# Patient Record
Sex: FEMALE | Race: OTHER | NOT HISPANIC OR LATINO | URBAN - METROPOLITAN AREA
[De-identification: names, ages, dates, MRNs, and addresses within clinical notes are randomized per-mention and may not be internally consistent; named-entity substitution may affect disease eponyms.]

---

## 2017-11-25 ENCOUNTER — EMERGENCY (EMERGENCY)
Facility: HOSPITAL | Age: 32
LOS: 0 days | Discharge: HOME | End: 2017-11-25
Attending: EMERGENCY MEDICINE | Admitting: EMERGENCY MEDICINE
Payer: MEDICAID

## 2017-11-25 VITALS
DIASTOLIC BLOOD PRESSURE: 99 MMHG | OXYGEN SATURATION: 100 % | SYSTOLIC BLOOD PRESSURE: 157 MMHG | RESPIRATION RATE: 17 BRPM | TEMPERATURE: 98 F | HEART RATE: 70 BPM

## 2017-11-25 VITALS — HEIGHT: 64 IN | WEIGHT: 125 LBS

## 2017-11-25 DIAGNOSIS — M54.31 SCIATICA, RIGHT SIDE: ICD-10-CM

## 2017-11-25 PROCEDURE — 99283 EMERGENCY DEPT VISIT LOW MDM: CPT

## 2017-11-25 PROCEDURE — 72100 X-RAY EXAM L-S SPINE 2/3 VWS: CPT | Mod: 26

## 2017-11-25 RX ORDER — CYCLOBENZAPRINE HYDROCHLORIDE 10 MG/1
1 TABLET, FILM COATED ORAL
Qty: 15 | Refills: 0 | OUTPATIENT
Start: 2017-11-25 | End: 2017-11-30

## 2017-11-25 RX ORDER — KETOROLAC TROMETHAMINE 30 MG/ML
15 SYRINGE (ML) INJECTION ONCE
Qty: 0 | Refills: 0 | Status: DISCONTINUED | OUTPATIENT
Start: 2017-11-25 | End: 2017-11-25

## 2017-11-25 RX ORDER — IBUPROFEN 200 MG
1 TABLET ORAL
Qty: 15 | Refills: 0 | OUTPATIENT
Start: 2017-11-25

## 2017-11-25 RX ORDER — METHOCARBAMOL 500 MG/1
500 TABLET, FILM COATED ORAL ONCE
Qty: 0 | Refills: 0 | Status: COMPLETED | OUTPATIENT
Start: 2017-11-25 | End: 2017-11-25

## 2017-11-25 RX ADMIN — Medication 15 MILLIGRAM(S): at 20:17

## 2017-11-25 RX ADMIN — METHOCARBAMOL 500 MILLIGRAM(S): 500 TABLET, FILM COATED ORAL at 20:17

## 2017-11-25 NOTE — ED STATDOCS - NS_ ATTENDINGSCRIBEDETAILS _ED_A_ED_FT
I, Magdy Johnson MD,  performed the initial face to face bedside interview with this patient regarding history of present illness, review of symptoms and relevant past medical, social and family history.  I completed an independent physical examination.    The history, relevant review of systems, past medical and surgical history, medical decision making, and physical examination was documented by the scribe in my presence and I attest to the accuracy of the documentation.

## 2017-11-25 NOTE — ED STATDOCS - PROGRESS NOTE DETAILS
signed Mary Mccartney PA-C Pt seen initially in intake by Dr Johnson.   Pt c/o pain radiating down LLE for several months, worsening in past few days, denies trauma or increased activity. Pt has referral from PMD for neuro. Pt denies fever/chills, numbness, lower extremity weakness, saddle anesthesia,  or incontinence. Plan xray, toradol, robaxin. DC home with motrin and robaxin rx. If robaxin not covered by insurance will add rx for flexeril. Pt agrees with plan of  care. signed Mary Mccartney PA-C Pt seen initially in intake by Dr Johnson.   Pt c/o pain radiating down LLE for several months, worsening in past few days, denies trauma or increased activity. Pt has referral from PMD for neuro. Pt denies fever/chills, numbness, lower extremity weakness, saddle anesthesia,  or incontinence. Plan xray, toradol, muscle relaxer. DC home with motrin and flexeril rx. Pt agrees with plan of  care. x ray shows slight spondylolisthesis of L5 over S1. outpt f/u spine or neuro. Pt feeling well, pt and family agree with DC and plan of care.

## 2017-11-25 NOTE — ED STATDOCS - CONDITION AT DISCHARGE:
“You can access the FollowHealth Patient Portal, offered by [facility], by registering with the following website: [email or web address]” Satisfactory

## 2017-11-25 NOTE — ED STATDOCS - OBJECTIVE STATEMENT
33 yo healthy female presents with shooting pains and numbness down left leg for months, worse since Friday.  Symptoms have been constant since yesterday.  Denies trauma or heavy lifting.  States her leg becomes stiff at times and she has difficulty moving it.  Took Tylenol without relief.  PCP Dr. Jimenez.

## 2017-11-25 NOTE — ED ADULT TRIAGE NOTE - CHIEF COMPLAINT QUOTE
patient complaining of left buttocks pain shooting down leg and numbness for a few weeks, worse today.

## 2017-11-26 RX ORDER — CYCLOBENZAPRINE HYDROCHLORIDE 10 MG/1
1 TABLET, FILM COATED ORAL
Qty: 15 | Refills: 0
Start: 2017-11-26 | End: 2017-12-01

## 2017-11-26 RX ORDER — IBUPROFEN 200 MG
1 TABLET ORAL
Qty: 15 | Refills: 0
Start: 2017-11-26

## 2017-11-26 NOTE — ED POST DISCHARGE NOTE - RESULT SUMMARY
Pt called back to have her Rx sent to [facility] as [facility] doesn't accept her insurance. Rx sent to [facility]

## 2018-04-28 NOTE — ED ADULT NURSE NOTE - TEMPLATE LIST FOR HEAD TO TOE ASSESSMENT
Mother  Still living? Unknown  Family history of cirrhosis of liver, Age at diagnosis: Age Unknown     Grandparent  Still living? Unknown  Family history of cirrhosis of liver, Age at diagnosis: Age Unknown
Orthopedic

## 2021-09-16 ENCOUNTER — EMERGENCY (EMERGENCY)
Facility: HOSPITAL | Age: 36
LOS: 1 days | End: 2021-09-16
Attending: EMERGENCY MEDICINE
Payer: MEDICAID

## 2021-09-16 VITALS — DIASTOLIC BLOOD PRESSURE: 80 MMHG | SYSTOLIC BLOOD PRESSURE: 155 MMHG

## 2021-09-16 VITALS
WEIGHT: 149.91 LBS | TEMPERATURE: 98 F | SYSTOLIC BLOOD PRESSURE: 180 MMHG | OXYGEN SATURATION: 98 % | HEIGHT: 64 IN | HEART RATE: 76 BPM | DIASTOLIC BLOOD PRESSURE: 98 MMHG | RESPIRATION RATE: 16 BRPM

## 2021-09-16 LAB
ALBUMIN SERPL ELPH-MCNC: 4.3 G/DL — SIGNIFICANT CHANGE UP (ref 3.3–5.2)
ALP SERPL-CCNC: 80 U/L — SIGNIFICANT CHANGE UP (ref 40–120)
ALT FLD-CCNC: 21 U/L — SIGNIFICANT CHANGE UP
ANION GAP SERPL CALC-SCNC: 17 MMOL/L — SIGNIFICANT CHANGE UP (ref 5–17)
AST SERPL-CCNC: 45 U/L — HIGH
BASOPHILS # BLD AUTO: 0.04 K/UL — SIGNIFICANT CHANGE UP (ref 0–0.2)
BASOPHILS NFR BLD AUTO: 0.4 % — SIGNIFICANT CHANGE UP (ref 0–2)
BILIRUB SERPL-MCNC: 0.5 MG/DL — SIGNIFICANT CHANGE UP (ref 0.4–2)
BUN SERPL-MCNC: 8.4 MG/DL — SIGNIFICANT CHANGE UP (ref 8–20)
CALCIUM SERPL-MCNC: 8.6 MG/DL — SIGNIFICANT CHANGE UP (ref 8.6–10.2)
CHLORIDE SERPL-SCNC: 100 MMOL/L — SIGNIFICANT CHANGE UP (ref 98–107)
CO2 SERPL-SCNC: 21 MMOL/L — LOW (ref 22–29)
CREAT SERPL-MCNC: 0.43 MG/DL — LOW (ref 0.5–1.3)
EOSINOPHIL # BLD AUTO: 0.1 K/UL — SIGNIFICANT CHANGE UP (ref 0–0.5)
EOSINOPHIL NFR BLD AUTO: 1.1 % — SIGNIFICANT CHANGE UP (ref 0–6)
GLUCOSE SERPL-MCNC: 78 MG/DL — SIGNIFICANT CHANGE UP (ref 70–99)
HCG SERPL-ACNC: <4 MIU/ML — SIGNIFICANT CHANGE UP
HCT VFR BLD CALC: 39.6 % — SIGNIFICANT CHANGE UP (ref 34.5–45)
HGB BLD-MCNC: 13.3 G/DL — SIGNIFICANT CHANGE UP (ref 11.5–15.5)
IMM GRANULOCYTES NFR BLD AUTO: 0.5 % — SIGNIFICANT CHANGE UP (ref 0–1.5)
LYMPHOCYTES # BLD AUTO: 2.46 K/UL — SIGNIFICANT CHANGE UP (ref 1–3.3)
LYMPHOCYTES # BLD AUTO: 27 % — SIGNIFICANT CHANGE UP (ref 13–44)
MCHC RBC-ENTMCNC: 25.9 PG — LOW (ref 27–34)
MCHC RBC-ENTMCNC: 33.6 GM/DL — SIGNIFICANT CHANGE UP (ref 32–36)
MCV RBC AUTO: 77.2 FL — LOW (ref 80–100)
MONOCYTES # BLD AUTO: 0.5 K/UL — SIGNIFICANT CHANGE UP (ref 0–0.9)
MONOCYTES NFR BLD AUTO: 5.5 % — SIGNIFICANT CHANGE UP (ref 2–14)
NEUTROPHILS # BLD AUTO: 5.95 K/UL — SIGNIFICANT CHANGE UP (ref 1.8–7.4)
NEUTROPHILS NFR BLD AUTO: 65.5 % — SIGNIFICANT CHANGE UP (ref 43–77)
PLATELET # BLD AUTO: 286 K/UL — SIGNIFICANT CHANGE UP (ref 150–400)
POTASSIUM SERPL-MCNC: 5.5 MMOL/L — HIGH (ref 3.5–5.3)
POTASSIUM SERPL-SCNC: 5.5 MMOL/L — HIGH (ref 3.5–5.3)
PROT SERPL-MCNC: 7.6 G/DL — SIGNIFICANT CHANGE UP (ref 6.6–8.7)
RBC # BLD: 5.13 M/UL — SIGNIFICANT CHANGE UP (ref 3.8–5.2)
RBC # FLD: 15.1 % — HIGH (ref 10.3–14.5)
SODIUM SERPL-SCNC: 138 MMOL/L — SIGNIFICANT CHANGE UP (ref 135–145)
WBC # BLD: 9.1 K/UL — SIGNIFICANT CHANGE UP (ref 3.8–10.5)
WBC # FLD AUTO: 9.1 K/UL — SIGNIFICANT CHANGE UP (ref 3.8–10.5)

## 2021-09-16 PROCEDURE — 99284 EMERGENCY DEPT VISIT MOD MDM: CPT | Mod: 25

## 2021-09-16 PROCEDURE — 70450 CT HEAD/BRAIN W/O DYE: CPT | Mod: 26,MD

## 2021-09-16 PROCEDURE — 96374 THER/PROPH/DIAG INJ IV PUSH: CPT

## 2021-09-16 PROCEDURE — 80053 COMPREHEN METABOLIC PANEL: CPT

## 2021-09-16 PROCEDURE — 72125 CT NECK SPINE W/O DYE: CPT | Mod: 26,MD

## 2021-09-16 PROCEDURE — 36415 COLL VENOUS BLD VENIPUNCTURE: CPT

## 2021-09-16 PROCEDURE — 99285 EMERGENCY DEPT VISIT HI MDM: CPT

## 2021-09-16 PROCEDURE — 84702 CHORIONIC GONADOTROPIN TEST: CPT

## 2021-09-16 PROCEDURE — 72125 CT NECK SPINE W/O DYE: CPT | Mod: MD

## 2021-09-16 PROCEDURE — 70450 CT HEAD/BRAIN W/O DYE: CPT | Mod: MD

## 2021-09-16 PROCEDURE — 85025 COMPLETE CBC W/AUTO DIFF WBC: CPT

## 2021-09-16 RX ORDER — SODIUM CHLORIDE 9 MG/ML
1000 INJECTION INTRAMUSCULAR; INTRAVENOUS; SUBCUTANEOUS ONCE
Refills: 0 | Status: COMPLETED | OUTPATIENT
Start: 2021-09-16 | End: 2021-09-16

## 2021-09-16 RX ORDER — DIPHENHYDRAMINE HCL 50 MG
25 CAPSULE ORAL ONCE
Refills: 0 | Status: COMPLETED | OUTPATIENT
Start: 2021-09-16 | End: 2021-09-16

## 2021-09-16 RX ORDER — METOCLOPRAMIDE HCL 10 MG
10 TABLET ORAL ONCE
Refills: 0 | Status: COMPLETED | OUTPATIENT
Start: 2021-09-16 | End: 2021-09-16

## 2021-09-16 RX ADMIN — SODIUM CHLORIDE 1000 MILLILITER(S): 9 INJECTION INTRAMUSCULAR; INTRAVENOUS; SUBCUTANEOUS at 21:00

## 2021-09-16 RX ADMIN — Medication 25 MILLIGRAM(S): at 20:58

## 2021-09-16 RX ADMIN — Medication 104 MILLIGRAM(S): at 20:58

## 2021-09-16 NOTE — ED PROVIDER NOTE - PROVIDER TOKENS
PROVIDER:[TOKEN:[[id number]],FOLLOWUP:[Routine]],PROVIDER:[TOKEN:[[id number]],FOLLOWUP:[Routine]]

## 2021-09-16 NOTE — ED ADULT NURSE REASSESSMENT NOTE - NS ED NURSE REASSESS COMMENT FT1
Pt is alert and oriented. Pt states that she has a headache for 3 days associated with a stiff neck. Pt denies sob, chest pain, nausea, vomiting, and dizziness. Pt resp are even and unlabored, skin color susan for race. Pt updated on plan of care.

## 2021-09-16 NOTE — ED PROVIDER NOTE - CRANIAL NERVE AND PUPILLARY EXAM
central and peripheral vision intact/peripheral vision intact/extra-ocular movements intact/tongue is midline

## 2021-09-16 NOTE — ED PROVIDER NOTE - CLINICAL SUMMARY MEDICAL DECISION MAKING FREE TEXT BOX
female with headache and neck pain x  3 days no focal neuro deficits vitals stable no fever no tachycardia mildly hypertensive. will treat pain ct and re-eval

## 2021-09-16 NOTE — ED PROVIDER NOTE - PATIENT PORTAL LINK FT
You can access the FollowMyHealth Patient Portal offered by [facility] by registering at the following website: [email or web address]. By joining [employer]’s FollowMyHealth portal, you will also be able to view your health information using other applications (apps) compatible with our system.

## 2021-09-16 NOTE — ED PROVIDER NOTE - CARE PLAN
Principal Discharge DX:	Headache, acute  Secondary Diagnosis:	Hypertension  Secondary Diagnosis:	Hyperkalemia   1

## 2021-09-16 NOTE — ED PROVIDER NOTE - CARE PROVIDER_API CALL
Yash Agrawal (MD; PhD)  Neurosurgery  [address] [place] [address]  Phone: [phone]  Fax: [phone]  Follow Up Time: Routine    Kirk Gaston; PhD)  Neurology; Vascular Neurology  [address], [address]  [place] [address]  Phone: [phone]  Fax: [phone]  Follow Up Time: Routine

## 2021-09-16 NOTE — ED PROVIDER NOTE - CARE PROVIDERS DIRECT ADDRESSES
,DirectAddress_Unknown,[email or web address]

## 2021-09-16 NOTE — ED PROVIDER NOTE - PROGRESS NOTE DETAILS
mild hyperkalemia  advised on results of CT and need for fu with PMD   advised on symptomatic monitoring and treatment and strict return precautions

## 2021-09-16 NOTE — ED PROVIDER NOTE - OBJECTIVE STATEMENT
34 yo female hx of htn noncompliant on medication presenting with headache to the left side of the head with pain to the ear and neck. reporting mild relief with tylenol and last took ibu 800mg 5pm with minimal symptomatic relief. recent travel to [facility] no uri symptoms. no numbness tingling no reported visual changes no fever or chills no chest pain sob no abdominal pain. went to PMD and was told that she needs an MRI but that it wouldn't happen for a while. was advised to come to the ER instead. has been recently adjusted by her boyfriend whom is a chiropractor also without relief.

## 2021-09-16 NOTE — ED PROVIDER NOTE - MUSCULOSKELETAL, MLM
Spine appears normal, no midline tenderness or step offs + left cervical paraspinal muscle tenderness  range of motion is not limited, no muscle or joint tenderness

## 2021-09-16 NOTE — ED PROVIDER NOTE - EYES, MLM
Clear bilaterally, pupils equal, round and reactive to light. EOMI peripheral fields intact finger to nose intact

## 2021-09-16 NOTE — ED ADULT TRIAGE NOTE - CHIEF COMPLAINT QUOTE
Patient ambulated into ED with steady gait, Pt c/o headache and worsening stiff neck x 3days, took Ibuprofen, has history of HTN, doesn't take medication consistently.

## 2021-10-05 PROBLEM — Z00.00 ENCOUNTER FOR PREVENTIVE HEALTH EXAMINATION: Status: ACTIVE | Noted: 2021-10-05

## 2021-10-07 ENCOUNTER — APPOINTMENT (OUTPATIENT)
Dept: NEUROSURGERY | Facility: CLINIC | Age: 36
End: 2021-10-07
Payer: MEDICAID

## 2021-10-07 DIAGNOSIS — Z86.19 PERSONAL HISTORY OF OTHER INFECTIOUS AND PARASITIC DISEASES: ICD-10-CM

## 2021-10-07 DIAGNOSIS — I67.1 CEREBRAL ANEURYSM, NONRUPTURED: ICD-10-CM

## 2021-10-07 DIAGNOSIS — Z86.79 PERSONAL HISTORY OF OTHER DISEASES OF THE CIRCULATORY SYSTEM: ICD-10-CM

## 2021-10-07 PROCEDURE — 99203 OFFICE O/P NEW LOW 30 MIN: CPT

## 2021-10-07 NOTE — PHYSICAL EXAM
[General Appearance - Alert] : alert [General Appearance - In No Acute Distress] : in no acute distress [Person] : oriented to person [Place] : oriented to place [Time] : oriented to time [Motor Strength] : muscle strength was normal in all four extremities [Involuntary Movements] : no involuntary movements were seen [Abnormal Walk] : normal gait

## 2021-10-08 NOTE — ASSESSMENT
[FreeTextEntry1] : Impression: 2mm lica carotid cave aneurysm \par \par Plan:\par Reviewed mra brain with patient which shows the presence of a 2mm left carotid cave aneurysm\par Discussed the mri brain showed no hemorrhage\par Discussed risk of this aneurysm rupturing is very low less than 1 % per year\par Recommend continued conservative management with repeat mra brain no con next year \par Should she have sudden onset severe headache she must seek medical attention immediately. \par

## 2021-10-08 NOTE — HISTORY OF PRESENT ILLNESS
[de-identified] : Abbie Recinos is a 35yr old female here for a  new patient visit. She recently was diagnosed with lymes disease and hypertension. Two weeks ago she has worst headache of her life on the right side and he right neck was stiff, her vision was blurry. This lasted a few days, went to the ER at [facility] CT head was normal and she went home. Had MRI brain and MRA brain done outpatient at [facility]. MRA brain noted small 2mm left carotid cave aneurysm. Today she presents feeling well. Grandmother  from subarachnoid hemorrhage from ruptured aneurysm in [facility]. \par \par Rene Vasquez PCP

## 2021-11-17 ENCOUNTER — OUTPATIENT (OUTPATIENT)
Dept: OUTPATIENT SERVICES | Facility: HOSPITAL | Age: 36
LOS: 1 days | End: 2021-11-17
Payer: MEDICAID

## 2021-11-17 VITALS
HEIGHT: 64 IN | RESPIRATION RATE: 18 BRPM | DIASTOLIC BLOOD PRESSURE: 96 MMHG | SYSTOLIC BLOOD PRESSURE: 133 MMHG | WEIGHT: 149.91 LBS | TEMPERATURE: 99 F | OXYGEN SATURATION: 96 % | HEART RATE: 87 BPM

## 2021-11-17 DIAGNOSIS — I67.1 CEREBRAL ANEURYSM, NONRUPTURED: ICD-10-CM

## 2021-11-17 DIAGNOSIS — Z90.49 ACQUIRED ABSENCE OF OTHER SPECIFIED PARTS OF DIGESTIVE TRACT: Chronic | ICD-10-CM

## 2021-11-17 LAB
ANION GAP SERPL CALC-SCNC: 14 MMOL/L — SIGNIFICANT CHANGE UP (ref 5–17)
BLD GP AB SCN SERPL QL: NEGATIVE — SIGNIFICANT CHANGE UP
BUN SERPL-MCNC: 11 MG/DL — SIGNIFICANT CHANGE UP (ref 7–23)
CALCIUM SERPL-MCNC: 9.1 MG/DL — SIGNIFICANT CHANGE UP (ref 8.4–10.5)
CHLORIDE SERPL-SCNC: 104 MMOL/L — SIGNIFICANT CHANGE UP (ref 96–108)
CO2 SERPL-SCNC: 21 MMOL/L — LOW (ref 22–31)
CREAT SERPL-MCNC: 0.73 MG/DL — SIGNIFICANT CHANGE UP (ref 0.5–1.3)
GLUCOSE SERPL-MCNC: 113 MG/DL — HIGH (ref 70–99)
HCT VFR BLD CALC: 39.1 % — SIGNIFICANT CHANGE UP (ref 34.5–45)
HGB BLD-MCNC: 13.2 G/DL — SIGNIFICANT CHANGE UP (ref 11.5–15.5)
MCHC RBC-ENTMCNC: 26.5 PG — LOW (ref 27–34)
MCHC RBC-ENTMCNC: 33.8 GM/DL — SIGNIFICANT CHANGE UP (ref 32–36)
MCV RBC AUTO: 78.4 FL — LOW (ref 80–100)
NRBC # BLD: 0 /100 WBCS — SIGNIFICANT CHANGE UP (ref 0–0)
PLATELET # BLD AUTO: 276 K/UL — SIGNIFICANT CHANGE UP (ref 150–400)
POTASSIUM SERPL-MCNC: 4.1 MMOL/L — SIGNIFICANT CHANGE UP (ref 3.5–5.3)
POTASSIUM SERPL-SCNC: 4.1 MMOL/L — SIGNIFICANT CHANGE UP (ref 3.5–5.3)
RBC # BLD: 4.99 M/UL — SIGNIFICANT CHANGE UP (ref 3.8–5.2)
RBC # FLD: 14.7 % — HIGH (ref 10.3–14.5)
RH IG SCN BLD-IMP: POSITIVE — SIGNIFICANT CHANGE UP
SODIUM SERPL-SCNC: 139 MMOL/L — SIGNIFICANT CHANGE UP (ref 135–145)
WBC # BLD: 7.74 K/UL — SIGNIFICANT CHANGE UP (ref 3.8–10.5)
WBC # FLD AUTO: 7.74 K/UL — SIGNIFICANT CHANGE UP (ref 3.8–10.5)

## 2021-11-17 PROCEDURE — 85027 COMPLETE CBC AUTOMATED: CPT

## 2021-11-17 PROCEDURE — 86850 RBC ANTIBODY SCREEN: CPT

## 2021-11-17 PROCEDURE — 86900 BLOOD TYPING SEROLOGIC ABO: CPT

## 2021-11-17 PROCEDURE — 86901 BLOOD TYPING SEROLOGIC RH(D): CPT

## 2021-11-17 PROCEDURE — 80048 BASIC METABOLIC PNL TOTAL CA: CPT

## 2021-11-17 PROCEDURE — G0463: CPT

## 2021-11-17 PROCEDURE — 36415 COLL VENOUS BLD VENIPUNCTURE: CPT

## 2021-11-17 NOTE — H&P PST ADULT - HISTORY OF PRESENT ILLNESS
This is a 37 y/o female PMH hypertension, c/o severe headache, presented to the ER and was found to have cerebral aneurysm on imaging.  Presents today for cerebral angiogram. This is a 37 y/o female PMH hypertension, c/o severe headache, was found to have cerebral aneurysm on imaging last month.  Presents today for cerebral angiogram.

## 2021-11-17 NOTE — H&P PST ADULT - NSANTHOSAYNRD_GEN_A_CORE
No. BLANQUITA screening performed.  STOP BANG Legend: 0-2 = LOW Risk; 3-4 = INTERMEDIATE Risk; 5-8 = HIGH Risk

## 2021-11-17 NOTE — H&P PST ADULT - HEIGHT IN CM
"-- DO NOT REPLY / DO NOT REPLY ALL --  -- Message is from the [employer]--    COVID-19 Universal Screening: Negative    Caller is requesting an appointment - at a sooner time than what was available. Caller declined scheduling with a trusted partner or sister site               Patient is willing to be seen by: PCP only    Reason for Visit: Patient has surgery coming up in March patient is requesting a sooner appointment     Is the patient currently scheduled? Yes. Appointment date:  2/15/2021    Preferred time to be seen: morning    Caller Information       Type Contact Phone    02/05/2021 10:30 AM CST Phone (Incoming) Yari Valderrama (Self) [phone] (H)          Alternative phone number: none    Turnaround time given to caller: ""This message will be sent to [facility] Provider's name]. The clinical team will fulfill your request as soon as they review your message. \""  "
162.56

## 2021-11-20 ENCOUNTER — OUTPATIENT (OUTPATIENT)
Dept: OUTPATIENT SERVICES | Facility: HOSPITAL | Age: 36
LOS: 1 days | End: 2021-11-20
Payer: MEDICAID

## 2021-11-20 DIAGNOSIS — Z11.52 ENCOUNTER FOR SCREENING FOR COVID-19: ICD-10-CM

## 2021-11-20 DIAGNOSIS — Z90.49 ACQUIRED ABSENCE OF OTHER SPECIFIED PARTS OF DIGESTIVE TRACT: Chronic | ICD-10-CM

## 2021-11-20 LAB — SARS-COV-2 RNA SPEC QL NAA+PROBE: SIGNIFICANT CHANGE UP

## 2021-11-20 PROCEDURE — C9803: CPT

## 2021-11-20 PROCEDURE — U0003: CPT

## 2021-11-20 PROCEDURE — U0005: CPT

## 2021-11-23 ENCOUNTER — OUTPATIENT (OUTPATIENT)
Dept: OUTPATIENT SERVICES | Facility: HOSPITAL | Age: 36
LOS: 1 days | End: 2021-11-23
Payer: MEDICAID

## 2021-11-23 ENCOUNTER — APPOINTMENT (OUTPATIENT)
Dept: NEUROSURGERY | Facility: HOSPITAL | Age: 36
End: 2021-11-23
Payer: MEDICAID

## 2021-11-23 VITALS
WEIGHT: 149.91 LBS | HEIGHT: 64 IN | HEART RATE: 76 BPM | OXYGEN SATURATION: 100 % | DIASTOLIC BLOOD PRESSURE: 86 MMHG | RESPIRATION RATE: 18 BRPM | TEMPERATURE: 98 F | SYSTOLIC BLOOD PRESSURE: 147 MMHG

## 2021-11-23 VITALS
DIASTOLIC BLOOD PRESSURE: 68 MMHG | SYSTOLIC BLOOD PRESSURE: 130 MMHG | TEMPERATURE: 99 F | HEART RATE: 84 BPM | RESPIRATION RATE: 16 BRPM | OXYGEN SATURATION: 99 %

## 2021-11-23 DIAGNOSIS — I67.1 CEREBRAL ANEURYSM, NONRUPTURED: ICD-10-CM

## 2021-11-23 DIAGNOSIS — Z90.49 ACQUIRED ABSENCE OF OTHER SPECIFIED PARTS OF DIGESTIVE TRACT: Chronic | ICD-10-CM

## 2021-11-23 PROCEDURE — 36227 PLACE CATH XTRNL CAROTID: CPT

## 2021-11-23 PROCEDURE — C1760: CPT

## 2021-11-23 PROCEDURE — 36224 PLACE CATH CAROTD ART: CPT

## 2021-11-23 PROCEDURE — 36226 PLACE CATH VERTEBRAL ART: CPT | Mod: 50

## 2021-11-23 PROCEDURE — C1769: CPT

## 2021-11-23 PROCEDURE — 36226 PLACE CATH VERTEBRAL ART: CPT

## 2021-11-23 PROCEDURE — C1894: CPT

## 2021-11-23 PROCEDURE — C1887: CPT

## 2021-11-23 PROCEDURE — 76377 3D RENDER W/INTRP POSTPROCES: CPT | Mod: 26

## 2021-11-23 PROCEDURE — 36224 PLACE CATH CAROTD ART: CPT | Mod: 50

## 2021-11-23 RX ORDER — SODIUM CHLORIDE 9 MG/ML
1000 INJECTION INTRAMUSCULAR; INTRAVENOUS; SUBCUTANEOUS
Refills: 0 | Status: DISCONTINUED | OUTPATIENT
Start: 2021-11-23 | End: 2021-12-07

## 2021-11-23 RX ORDER — LABETALOL HCL 100 MG
1 TABLET ORAL
Qty: 0 | Refills: 0 | DISCHARGE

## 2021-11-23 NOTE — ASU DISCHARGE PLAN (ADULT/PEDIATRIC) - NURSING INSTRUCTIONS
Please feel free to contact us at [phone] if any problems arise. After 6PM, Monday through Friday, on weekends and on holidays, please call [phone] and ask for the radiology resident on call to be paged.

## 2021-11-23 NOTE — CHART NOTE - NSCHARTNOTEFT_GEN_A_CORE
Interventional Neuro Radiology  Pre-Procedure Note PA-C    This is a 36 year old right hand dominant female with a family history significant for           Allergies: No Known Allergies  PMHX: Hypertension  PSHX: appendectomy  Social History:   FAMILY HISTORY:+ grand mother ruptured aneurysm   Current Medications: Labetalol 200mg every 12 hours     Labs:                   Blood Bank:       Assessment/Plan:   This is a 36 year old right hand dominant female presents with   Patient presents to neuro-IR for selective cerebral angiography.   Procedure, goals, risks, benefits and alternatives  were discussed with patient and patient's family.  All questions were answered.  Risks include but are not limited to stroke, vessel injury, hemorrhage, and or right  groin hematoma.  Patient demonstrates understanding  of all risks involved with this procedure and wishes to continue.   Appropriate  content was obtained from patient and consent is in the patient's chart. Interventional Neuro Radiology  Pre-Procedure Note PA-C    This is a 36 year old right hand dominant female with a family history significant for ruptured aneurysm, her grandmother, and recent complaints of progressive headaches. Patient presents to Neuro IR for a selective cerebral angiography.     Upon exam patient is A + O x 3, speech is fluent, recent and remote memory intact, follows commands, PERRL, tongue is midline, moves all extremities, sensation intact, motor 5/5, ambulates without assist.      Allergies: No Known Allergies  PMHX: Hypertension  PSHX: appendectomy  Social History:   FAMILY HISTORY:+ grand mother ruptured aneurysm   Current Medications: Labetalol 200mg every 12 hours     Labs:                   Blood Bank:       Assessment/Plan:   This is a 36 year old right hand dominant female with complaints of headaches, with a finding of 2 left cavernous carotid artery aneurysm. Procedure, goals, risks, benefits and alternatives  were discussed with patient and patient's fiance. All questions were answered. Risks include but are not limited to stroke, vessel injury, hemorrhage, and or right groin hematoma.  Patient demonstrates understanding  of all risks involved with this procedure and wishes to continue.  Appropriate consent was obtained from patient and consent is in the patient's chart. Interventional Neuro Radiology  Pre-Procedure Note PA-C    This is a 36 year old right hand dominant female with a family history significant for ruptured aneurysm, her grandmother, and recent complaints of progressive headaches. Patient presents to Neuro IR for a selective cerebral angiography.     Upon exam patient is A + O x 3, speech is fluent, recent and remote memory intact, follows commands, PERRL, tongue is midline, moves all extremities, sensation intact, motor 5/5, ambulates without assist.      Allergies: No Known Allergies  PMHX: Hypertension  PSHX: appendectomy  Social History:   FAMILY HISTORY:+ grand mother ruptured aneurysm   Current Medications: Labetalol 200mg every 12 hours   Covid: not detected     CBC:          13.2  7.74   39.1   276    139  104  11    4.1   21  0.73  113      Blood Bank: B positive available     Assessment/Plan:   This is a 36 year old right hand dominant female with complaints of headaches, with a finding of 2 left cavernous carotid artery aneurysm. Procedure, goals, risks, benefits and alternatives  were discussed with patient and patient's fiance. All questions were answered. Risks include but are not limited to stroke, vessel injury, hemorrhage, and or right groin hematoma.  Patient demonstrates understanding  of all risks involved with this procedure and wishes to continue. Appropriate consent was obtained from patient and consent is in the patient's chart.

## 2021-11-23 NOTE — ASU DISCHARGE PLAN (ADULT/PEDIATRIC) - CARE PROVIDER_API CALL
Pavel Mallory)  Neurosurgery  [address], [address]  [place] [address]  Phone: [phone]  Fax: [phone]  Follow Up Time:

## 2021-11-23 NOTE — CHART NOTE - NSCHARTNOTEFT_GEN_A_CORE
Interventional Neuro- Radiology   Procedure Note PA-C    Procedure: Selective Cerebral Angiography   Pre- Procedure Diagnosis:  Post- Procedure Diagnosis:    : Dr Pavel Mallory  Physician Assistant: Anastasia Burris PA-C    Nurse:                   Raoul Hogan RN  Radiologic Tech:   Oniel Blair  Anesthesiologist:   Dr Bushra Rosenthal   Sheath:      I/Os: EBL less than 10cc  IV fluids:     cc Urine output     cc    Contrast Omnipaque 240      cc             Vitals: BP         HR 70     Spo2 99%          Preliminary Report:  Using a 5 [place]an short sheath to the right groin under MAC sedation via left vertebral artery,  left internal carotid artery, left external carotid artery, right vertebral artery, right internal carotid artery, right external carotid artery a selective cerebral angiography was performed and demonstrated                       Official note to follow.  Patient tolerated procedure well, hemodynamically stable, no change in neurological status compared to baseline.  Results discussed with neuro ICU team, patient and patient's family. Right groin sheath was removed, manual compression held to hemostasis for 20 minutes, no active bleeding, no hematoma, quick clot and safeguard balloon dressing applied at Interventional Neuro- Radiology   Procedure Note PA-C    Procedure: Selective Cerebral Angiography   Pre- Procedure Diagnosis: cerebral aneurysms   Post- Procedure Diagnosis:    : Dr Pavel Mallory  Physician Assistant: Anastasia Burris PA-C    Nurse:                   Raoul Hogan RN  Radiologic Tech:   Oniel Blair  Anesthesiologist:  Dr Bushra Nieves Galo   Sheath:                5 Argentine short sheath       I/Os: EBL less than 10cc  IV fluids:     cc Urine due to void  Contrast Omnipaque 240              Vitals: BP         HR 70     Spo2 99%          Preliminary Report:  Using a 5 Argentine short sheath to the right groin under MAC sedation via left vertebral artery, left internal carotid artery, left external carotid artery, right vertebral artery, right internal carotid artery, right external carotid artery a selective cerebral angiography was performed and demonstrated                       Official note to follow.  Patient tolerated procedure well, hemodynamically stable, no change in neurological status compared to baseline. Results discussed with patient and patient's fiance. Right groin sheath was removed, a Vascade device and manual compression held to hemostasis for 20 minutes, no active bleeding, no hematoma, quick clot and safeguard balloon dressing applied at Interventional Neuro- Radiology   Procedure Note PA-C    Procedure: Selective Cerebral Angiography   Pre- Procedure Diagnosis: cerebral aneurysms   Post- Procedure Diagnosis:    : Dr Pavel Mallory  Physician Assistant: Anastasia Burris PA-C    Nurse:                   Raoul Hogan RN  Radiologic Tech:   Oniel Blair  Anesthesiologist:  Dr Bushra Nieves Galo   Sheath:                 5 Frisian short sheath       I/Os: EBL less than 10cc  IV fluids:     cc Urine due to void  Contrast Omnipaque 240              Vitals: BP         HR 70     Spo2 99%          Preliminary Report:  Using a 5 Frisian short sheath to the right groin under MAC sedation via left vertebral artery, left internal carotid artery, left external carotid artery, right vertebral artery, right internal carotid artery, right external carotid artery a selective cerebral angiography was performed and demonstrated                       Official note to follow.  Patient tolerated procedure well, hemodynamically stable, no change in neurological status compared to baseline. Results discussed with patient and patient's fiance. Right groin sheath was removed, a Vascade device and manual compression held to hemostasis for 20 minutes, no active bleeding, no hematoma, quick clot and safeguard balloon dressing applied at Interventional Neuro- Radiology   Procedure Note PA-C    Procedure: Selective Cerebral Angiography   Pre- Procedure Diagnosis: cerebral aneurysms   Post- Procedure Diagnosis: 3mm left paraclinoid artery aneurysm     : Dr Pavel Mallory  Fellow:     Dr Mathew Gonzales   Physician Assistant: KARRIE Pineda PA-C  Resident:                  Dr Singh Mota     Nurse:                   Penny Vergara RN  Radiologic Tech:   Oniel Blair  Anesthesiologist:  Dr Bushra Nieves Galo   Sheath:                 5 Citizen of the [place] short sheath       I/Os: EBL less than 10cc  IV fluids: 250cc Urine due to void  Contrast Omnipaque 240 87cc             Vitals: /70        HR 70     Spo2 99%          Preliminary Report:  Using a 5 Citizen of the [place] short sheath to the right groin under MAC sedation via left vertebral artery, left internal carotid artery, left external carotid artery, right vertebral artery, right internal carotid artery, right external carotid artery a selective cerebral angiography was performed and demonstrated 3mm left paraclinoid artery aneurysm. Official note to follow.  Patient tolerated procedure well, hemodynamically stable, no change in neurological status compared to baseline. Results discussed with patient and patient's fiance. Right groin sheath was removed, a Vascade device and manual compression held to hemostasis for 20 minutes, no active bleeding, no hematoma, quick clot and safeguard balloon dressing applied at Interventional Neuro- Radiology   Procedure Note PA-C    Procedure: Selective Cerebral Angiography   Pre- Procedure Diagnosis: cerebral aneurysms   Post- Procedure Diagnosis: 3mm left paraclinoid artery aneurysm     : Dr Pavel Mallory  Fellow:     Dr Mathew Gonzales   Physician Assistant: KARRIE Pineda PA-C  Resident:                  Dr Singh Mota     Nurse:                   Penny Vergara RN  Radiologic Tech:   Oniel Blair  Anesthesiologist:  Dr Bushra Nieves Galo   Sheath:                 5 [place]ib[place]ian short sheath       I/Os: EBL less than 10cc  IV fluids: 250cc Urine due to void  Contrast Omnipaque 240 87cc             Vitals: /70        HR 70     Spo2 99%          Preliminary Report:  Using a 5 Djib[place]ian short sheath to the right groin under MAC sedation via left vertebral artery, left internal carotid artery, left external carotid artery, right vertebral artery, right internal carotid artery, right external carotid artery a selective cerebral angiography was performed and demonstrated 3mm left paraclinoid artery aneurysm. Official note to follow.  Patient tolerated procedure well, hemodynamically stable, no change in neurological status compared to baseline. Results discussed with patient and patient's fiance. Right groin sheath was removed, a Vascade device and manual compression by myself held to hemostasis for 20 minutes. No active bleeding, no hematoma, quick clot and safeguard balloon dressing applied at 2:45pm. Disposition IR recovery room.

## 2021-12-17 ENCOUNTER — EMERGENCY (EMERGENCY)
Facility: HOSPITAL | Age: 36
LOS: 0 days | Discharge: HOME | End: 2021-12-17
Attending: EMERGENCY MEDICINE
Payer: MEDICAID

## 2021-12-17 VITALS
OXYGEN SATURATION: 98 % | SYSTOLIC BLOOD PRESSURE: 136 MMHG | HEART RATE: 85 BPM | RESPIRATION RATE: 18 BRPM | DIASTOLIC BLOOD PRESSURE: 73 MMHG | TEMPERATURE: 98 F

## 2021-12-17 VITALS
OXYGEN SATURATION: 95 % | WEIGHT: 151.9 LBS | TEMPERATURE: 98 F | DIASTOLIC BLOOD PRESSURE: 78 MMHG | HEIGHT: 64 IN | RESPIRATION RATE: 16 BRPM | HEART RATE: 84 BPM | SYSTOLIC BLOOD PRESSURE: 152 MMHG

## 2021-12-17 DIAGNOSIS — Z04.1 ENCOUNTER FOR EXAMINATION AND OBSERVATION FOLLOWING TRANSPORT ACCIDENT: ICD-10-CM

## 2021-12-17 DIAGNOSIS — Z90.49 ACQUIRED ABSENCE OF OTHER SPECIFIED PARTS OF DIGESTIVE TRACT: Chronic | ICD-10-CM
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
## 2021-12-17 DIAGNOSIS — R11.0 NAUSEA: ICD-10-CM

## 2021-12-17 PROCEDURE — 99283 EMERGENCY DEPT VISIT LOW MDM: CPT

## 2021-12-17 NOTE — ED STATDOCS - MDM ORDERS SUBMITTED SELECTION
November 21, 2017        Saira Groves  [address] [place] [address]          Dear Saira,    We have received the results of your recent Treadmill Stress Test 11/15/2017.    Your test came back unchanged or within normal limits.      Please follow up as previously discussed with your physician.      Appt with  To January 2018  (not yet scheduled).    Feel free to call us with any questions.        Sincerely,        [facility]  Electronically Signed  
Not Applicable

## 2021-12-17 NOTE — ED STATDOCS - NSFOLLOWUPINSTRUCTIONS_ED_ALL_ED_FT
Please return if you have any worrying symptoms including increasing pain, vaginal bleeding, or any other concerns.    Otherwise please follow up with your OB/Gyn as scheduled.
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
## 2021-12-17 NOTE — ED STATDOCS - CLINICAL SUMMARY MEDICAL DECISION MAKING FREE TEXT BOX
Normal exam.  No signs of trauma.  No abdominal tenderness.  Pt does not want US in ED.  Has appointment for US on Wednesday.  D/c home with return precautions.
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
## 2021-12-17 NOTE — ED ADULT NURSE NOTE - NSIMPLEMENTINTERV_GEN_ALL_ED
Implemented All Fall Risk Interventions:  [place] to call system. Call bell, personal items and telephone within reach. Instruct patient to call for assistance. Room bathroom lighting operational. Non-slip footwear when patient is off stretcher. Physically safe environment: no spills, clutter or unnecessary equipment. Stretcher in lowest position, wheels locked, appropriate side rails in place. Provide visual cue, wrist band, yellow gown, etc. Monitor gait and stability. Monitor for mental status changes and reorient to person, place, and time. Review medications for side effects contributing to fall risk. Reinforce activity limits and safety measures with patient and family.

## 2021-12-17 NOTE — ED ADULT NURSE REASSESSMENT NOTE - NS ED NURSE REASSESS COMMENT FT1
Patient refused ultrasound.  Patient denies, abdominal pain, cramping or bleeding.  Patient denies pain at this time.  Patient evaluated by MD Vivar stable for discharge at this time.  VS as charted.

## 2021-12-17 NOTE — ED STATDOCS - PATIENT PORTAL LINK FT
You can access the FollowMyHealth Patient Portal offered by [facility] by registering at the following website: [email or web address]. By joining [facility]’s FollowMyHealth portal, you will also be able to view your health information using other applications (apps) compatible with our system.

## 2021-12-18 PROBLEM — I10 ESSENTIAL (PRIMARY) HYPERTENSION: Chronic | Status: ACTIVE | Noted: 2021-11-17

## 2022-01-19 ENCOUNTER — NON-APPOINTMENT (OUTPATIENT)
Age: 37
End: 2022-01-19

## 2022-02-14 NOTE — ED ADULT TRIAGE NOTE - WEIGHT METHOD
COVID-19 Screening:    • Does the patient OR patient’s household members have any of the following symptoms?  o Temperature: Fever ?100.0°F or ?37.8°C?  No  o Respiratory symptoms: New or worsening cough, shortness of breath, difficulty breathing, or sore throat? No  o GI symptoms: New onset of nausea, vomiting or diarrhea?  No  o Miscellaneous: New onset of loss of taste or smell, chills, repeated shaking with chills, muscle pain, headache, congestion or runny nose?  No  • Has the patient or a household member tested positive for COVID-19 in the last 14 days?  No  • Has the patient or a household member been tested for COVID-19 and are waiting for the results?  No     stated

## 2022-03-05 NOTE — ED PROVIDER NOTE - ATTENDING CONTRIBUTION TO CARE
No
HPI: 36yo F with PMH HTN p/w dull L-sided HA that started a few days ago, no vomiting, visual changes, fevers, chills.     PE:  Gen: NAD  Head: NCAT  HEENT: Oral mucosa moist, normal conjunctiva  CV: RRR no murmurs, normal perfusion  Resp: CTA b/l, no wheezing, rales, rhonchi, no respiratory distress  GI: Abd Soft NTND  Neuro: +TTP over suboccipital region on L, No focal neuro deficits, strength 5/5 in all extremities, sensation intact throughout, normal gait, CN II-XII intact, no nuchal rigidity  MSK: FROM all 4 extremities, no deformity  Skin: No rash, no bruising  Psych: Normal affect    MDM: Pt with headache x several days, no red flags, imaging/labs normal, feeling better, stable for dc. Magali Montiel DO     I performed a history and physical exam of the patient and discussed their management with the advanced care provider. I reviewed the advanced care provider's note and agree with the documented findings and plan of care. My medical decision making and objective findings are found above.

## 2022-06-23 ENCOUNTER — NON-APPOINTMENT (OUTPATIENT)
Age: 37
End: 2022-06-23

## 2023-04-10 NOTE — ED STATDOCS - TOBACCO USE
Unknown if ever smoked Birth Control Pills Counseling: Birth Control Pill Counseling: I discussed with the patient the potential side effects of OCPs including but not limited to increased risk of stroke, heart attack, thrombophlebitis, deep venous thrombosis, hepatic adenomas, breast changes, GI upset, headaches, and depression.  The patient verbalized understanding of the proper use and possible adverse effects of OCPs. All of the patient's questions and concerns were addressed.

## 2024-06-24 NOTE — ED STATDOCS - CONSTITUTIONAL, MLM
Calledpatient and relayed  message - verbalized understanding , Information for DR. Jiménez sent via [employer]   normal... well appearing, well nourished, and in no apparent distress.

## 2025-02-27 ENCOUNTER — RX ONLY (RX ONLY)
Age: 40
End: 2025-02-27

## 2025-02-27 ENCOUNTER — OFFICE (OUTPATIENT)
Dept: URBAN - METROPOLITAN AREA CLINIC 104 | Facility: CLINIC | Age: 40
Setting detail: OPHTHALMOLOGY
End: 2025-02-27
Payer: COMMERCIAL

## 2025-02-27 DIAGNOSIS — H16.223: ICD-10-CM

## 2025-02-27 DIAGNOSIS — H52.13: ICD-10-CM

## 2025-02-27 PROCEDURE — 92015 DETERMINE REFRACTIVE STATE: CPT | Performed by: OPTOMETRIST

## 2025-02-27 PROCEDURE — 92004 COMPRE OPH EXAM NEW PT 1/>: CPT | Performed by: OPTOMETRIST

## 2025-02-27 ASSESSMENT — REFRACTION_MANIFEST
OD_AXIS: 035
OS_VA1: 20/20
OS_SPHERE: -2.25
OD_CYLINDER: -0.50
OD_SPHERE: -2.25
OD_VA1: 20/20
OS_AXIS: 155
OS_CYLINDER: -1.00

## 2025-02-27 ASSESSMENT — REFRACTION_AUTOREFRACTION
OS_CYLINDER: -1.50
OS_AXIS: 165
OD_SPHERE: -2.00
OD_AXIS: 033
OD_CYLINDER: -0.75
OS_SPHERE: -2.00

## 2025-02-27 ASSESSMENT — REFRACTION_CURRENTRX
OD_AXIS: 037
OS_OVR_VA: 20/
OS_CYLINDER: -1.00
OD_CYLINDER: -0.50
OS_SPHERE: -2.25
OD_OVR_VA: 20/
OS_AXIS: 155
OD_SPHERE: -2.25

## 2025-02-27 ASSESSMENT — SUPERFICIAL PUNCTATE KERATITIS (SPK)
OD_SPK: T
OS_SPK: T

## 2025-02-27 ASSESSMENT — VISUAL ACUITY
OD_BCVA: 20/20
OS_BCVA: 20/20

## 2025-02-27 ASSESSMENT — KERATOMETRY
OS_K1POWER_DIOPTERS: 42.03
OS_K2POWER_DIOPTERS: 43.66
OD_K1POWER_DIOPTERS: 42.19
OS_AXISANGLE_DEGREES: 077
OD_AXISANGLE_DEGREES: 077
OD_K2POWER_DIOPTERS: 44.00

## 2025-02-27 ASSESSMENT — CONFRONTATIONAL VISUAL FIELD TEST (CVF)
OS_FINDINGS: FULL
OD_FINDINGS: FULL

## 2025-04-29 NOTE — ASU PREOP CHECKLIST - DENTURES
Legal Name: Mini Pearce                 CSN: [id number]    Surgeon:Dr. Braden Mann           Date of Surgery: 06/02/25 DEPUY LT KNEE [facility]    PLEASE INDICATE WHAT TYPES OF CLEARANCES ARE NEEDED FOR THE PATIENT BY CLICKING IN THE BOXES BELOW.   PLEASE SELECT ALL BOXES THAT APPLY AND THEN ENTER THE PROVIDERS CONTACT INFORMATION.    []  HISTORY AND PHYSICAL [x]  MEDICAL CLEARANCE  [x]  CARDIAC CLEARANCE []  SPECIALTY CLEARANCE    PROVIDER NAMES PHONE/FAX NUMBERS APPOINTMENT DATE   PCP:Dr. Dayady Meek P:[phone]                           F:[phone] Clrd faxed to  [facility] 5/22   CARDIO: Dr. Ankit Salinas P:[phone]                            F:[phone] 05/13 clrd    SPECIALTY: P:                           F:    SPECIALTY:  P:                           F:      TESTING BEING REQUESTED FROM THE SURGEON (PLEASE NOTE: THESE ARE NOT ORDERS. IF THE SURGEON WANTS TESTING DOS, THE ORDER NEEDS TO BE ENTERED IN EPIC.)   CMP X T/S  EKG X   BMP  HEMA1C  X-RAY    CBC X HCG  CT    CBC DIFF  UA  ECHO    PT/INR  URINE C/S  STRESS TEST    PTT  UCG  CAROTID U/S    MRSA          ADDITIONAL INFO/ PATIENT NEEDS/CONCERNS: PATIENT HAS A WALKER    TRACK 2-3     [facility]     [facility]    [facility] READY FOR DELIVERY    no